# Patient Record
Sex: FEMALE | Race: BLACK OR AFRICAN AMERICAN | NOT HISPANIC OR LATINO | Employment: FULL TIME | URBAN - METROPOLITAN AREA
[De-identification: names, ages, dates, MRNs, and addresses within clinical notes are randomized per-mention and may not be internally consistent; named-entity substitution may affect disease eponyms.]

---

## 2017-01-17 ENCOUNTER — ANTI-COAG VISIT (OUTPATIENT)
Dept: CARDIOLOGY | Facility: CLINIC | Age: 52
End: 2017-01-17
Payer: MEDICAID

## 2017-01-17 DIAGNOSIS — Z79.01 LONG TERM (CURRENT) USE OF ANTICOAGULANTS: Primary | ICD-10-CM

## 2017-01-17 DIAGNOSIS — I73.9 PAD (PERIPHERAL ARTERY DISEASE): ICD-10-CM

## 2017-01-17 LAB
CTP QC/QA: NORMAL
INR PPP: 2.7 (ref 2–3)

## 2017-01-17 PROCEDURE — 99999 PR PBB SHADOW E&M-EST. PATIENT-LVL I: CPT | Mod: PBBFAC,,,

## 2017-01-17 PROCEDURE — 99211 OFF/OP EST MAY X REQ PHY/QHP: CPT | Mod: PBBFAC

## 2017-01-17 PROCEDURE — 85610 PROTHROMBIN TIME: CPT | Mod: PBBFAC

## 2017-01-17 NOTE — PROGRESS NOTES
Patient started Narco for pain. This change in medications won't affect warfarin. Patient is stable on this dose. She will continue this dose until follow-up. ROWENA Hood advised her to contact us with any changes or problems.

## 2017-04-25 ENCOUNTER — ANTI-COAG VISIT (OUTPATIENT)
Dept: CARDIOLOGY | Facility: CLINIC | Age: 52
End: 2017-04-25

## 2017-04-25 DIAGNOSIS — I73.9 PAD (PERIPHERAL ARTERY DISEASE): ICD-10-CM

## 2017-04-25 DIAGNOSIS — Z79.01 LONG TERM (CURRENT) USE OF ANTICOAGULANTS: ICD-10-CM

## 2017-04-25 NOTE — PROGRESS NOTES
The [facility] staff has tried to reach the patient by telephone on 2/15, 2/20, 2/24, 3/17/17 and 4/6/17.  A missed letter was sent to the patient's address on file on 4/18, in addition to notifying the enrolling physician.  The patient has not responded to any of the previous correspondence.  Therefore, I am now discharging the patient from the [facility] Clinic, per our missed protocol.  This discharge information will be sent to the patients enrolling physician and a letter with this information will be sent to the patient.

## 2021-04-26 ENCOUNTER — PATIENT MESSAGE (OUTPATIENT)
Dept: RESEARCH | Facility: HOSPITAL | Age: 56
End: 2021-04-26

## 2023-05-27 NOTE — LETTER
April 25, 2017    Nae WHITING Scherer  [address] [facility] [id number]             [facility]  [address] [place] [address]  Phone: [phone]  Fax: [phone] Dear MsJake Gilmer:     The [facility] has been unsuccessful in getting in touch with you regarding your missed appointment for a PT/INR check.  Your appointment is overdue.  Despite phone calls and a previous letter sent to your address on file, you have not called our clinic to reschedule your appointment.  It is very important that you are monitored regularly while on Coumadin (warfarin).     You have been discharged from the [facility]madin Clinic.  Your physician has been notified of your discharge.  If you still require monitoring for your Coumadin therapy please call your physician immediately so that you can resume regular monitoring.  If the physician recommends that our clinic continue to monitor your Coumadin therapy, a new enrollment form will be required before we can resume care.  If you are no longer on Coumadin or no longer require monitoring by our clinic, please contact our clinic so we may update our records.     It has been a pleasure providing your care.      Sincerely,  [facility] Staff      none